# Patient Record
Sex: MALE | Race: WHITE | NOT HISPANIC OR LATINO
[De-identification: names, ages, dates, MRNs, and addresses within clinical notes are randomized per-mention and may not be internally consistent; named-entity substitution may affect disease eponyms.]

---

## 2021-08-16 PROBLEM — Z00.00 ENCOUNTER FOR PREVENTIVE HEALTH EXAMINATION: Status: ACTIVE | Noted: 2021-08-16

## 2022-04-12 ENCOUNTER — APPOINTMENT (OUTPATIENT)
Dept: ORTHOPEDIC SURGERY | Facility: CLINIC | Age: 72
End: 2022-04-12
Payer: MEDICARE

## 2022-04-12 DIAGNOSIS — Z78.9 OTHER SPECIFIED HEALTH STATUS: ICD-10-CM

## 2022-04-12 PROCEDURE — 99024 POSTOP FOLLOW-UP VISIT: CPT

## 2022-04-12 NOTE — REASON FOR VISIT
[FreeTextEntry2] : pt is here for a po visit  pt had surgery on 3/10/22  for his right knee. the pain in the right knee  has been good. pt is doing pt which has been helping a lot.

## 2022-04-12 NOTE — HISTORY OF PRESENT ILLNESS
[3] : 3 [1] : 2 [Rest] : rest [Physical therapy] : physical therapy [] : Post Surgical Visit: yes [de-identified] : rt knee 1 month posto p [place]  [place]  [FreeTextEntry1] : right knee  [de-identified] : motion  [de-identified] : 3/10/22

## 2022-04-12 NOTE — DISCUSSION/SUMMARY
[de-identified] : feeling better will cont therapy and slowly start to resume activities as tolerated discusse dif needed  next visit possible injeciton\par normal course with good progress and no evidence of infection, continue rehab and appropriate nsaids\par \par

## 2022-04-12 NOTE — PHYSICAL EXAM
[Right] : right knee [3___] : quadriceps 3[unfilled]/5 [4___] : hamstring 4[unfilled]/5 [] : full flexion and extension without pain 0-140

## 2022-05-02 ENCOUNTER — APPOINTMENT (OUTPATIENT)
Dept: ORTHOPEDIC SURGERY | Facility: CLINIC | Age: 72
End: 2022-05-02
Payer: MEDICARE

## 2022-05-02 VITALS — BODY MASS INDEX: 25.11 KG/M2 | HEIGHT: 67 IN | WEIGHT: 160 LBS

## 2022-05-02 PROCEDURE — J3490M: CUSTOM

## 2022-05-02 PROCEDURE — 20611 DRAIN/INJ JOINT/BURSA W/US: CPT | Mod: RT,58

## 2022-05-02 PROCEDURE — 99024 POSTOP FOLLOW-UP VISIT: CPT

## 2022-05-02 PROCEDURE — L1833: CPT

## 2022-05-02 NOTE — REASON FOR VISIT
[FreeTextEntry2] : pt is here for a po visit of the right knee.  pt has pain in the right knee , pt was playing tennis for a few minutes  a week ago when  the knee started swelling.  pt is doing pt which has been helping a lot.

## 2022-05-02 NOTE — PROCEDURE
[FreeTextEntry3] : Large joint injection was performed of the right. The indication for this procedure was pain, inflammation and x-ray evidence of Osteoarthritis on this or prior visit. The site was prepped with betadine, ethyl chloride sprayed topically and sterile technique used. An injection of Zilretta cc of 32 unites 5mg was used.\par \par Patient was advised to call if redness, pain or fever occur and apply ice for 15 minutes out of every hour for the next 12-24 hours as tolerated.\par \par Patient has tried OTC's including aspirin, Ibuprofen, Aleve etc, or prescription NSAIDS, and/or exercises at home and/or physical therapy without satisfactory response, patient had decreased mobility in the joint and the risks, benefits and alternatives have been discussed, and verbal consent was obtained. Ultrasound guidance was indicated for this patient due to precise injection in the area of tear. All ultrasound images have been permanently captured and stored accordingly in our picture archiving and communication system. Visualization of the needle and placement of injection was performed without complication.\par \par aspiration 25cc synovial fluid

## 2022-05-02 NOTE — HISTORY OF PRESENT ILLNESS
[3] : 3 [1] : 2 [Dull/Aching] : dull/aching [Rest] : rest [Walking] : walking [] : Post Surgical Visit: yes [FreeTextEntry1] : right knee  [FreeTextEntry9] : advil [de-identified] : pt  [de-identified] : 3/14/22

## 2022-05-02 NOTE — DISCUSSION/SUMMARY
[Medication Risks Reviewed] : Medication risks reviewed [Surgical risks reviewed] : Surgical risks reviewed [de-identified] : swelling and effusion from arthritis , will aspirate and try zilretta\par The risks, benefits and contents of the injection have been discussed.  Risks include but are not limited to allergic reaction, flare reaction, permanent white skin discoloration at the injection site and infection.  The patient understands the risks and agrees to having the injection.  All questions have been answered.\par \par for functionalsupport recommend a hinged knee bvrace

## 2022-06-28 ENCOUNTER — APPOINTMENT (OUTPATIENT)
Dept: ORTHOPEDIC SURGERY | Facility: CLINIC | Age: 72
End: 2022-06-28

## 2022-06-28 VITALS — HEIGHT: 67 IN | WEIGHT: 160 LBS | BODY MASS INDEX: 25.11 KG/M2

## 2022-06-28 PROCEDURE — 99213 OFFICE O/P EST LOW 20 MIN: CPT | Mod: 25

## 2022-06-28 PROCEDURE — 20610 DRAIN/INJ JOINT/BURSA W/O US: CPT

## 2022-06-28 NOTE — PROCEDURE
[Large Joint Injection] : Large joint injection [Right] : of the right [Knee] : knee [Pain] : pain [Inflammation] : inflammation [X-ray evidence of Osteoarthritis on this or prior visit] : x-ray evidence of Osteoarthritis on this or prior visit [Betadine] : betadine [Ethyl Chloride sprayed topically] : ethyl chloride sprayed topically [Sterile technique used] : sterile technique used [Synvisc] : Synvisc [#1] : series #1 [] : Patient tolerated procedure well [Call if redness, pain or fever occur] : call if redness, pain or fever occur [Apply ice for 15min out of every hour for the next 12-24 hours as tolerated] : apply ice for 15 minutes out of every hour for the next 12-24 hours as tolerated [Previous OTC use and PT nontherapeutic] : patient has tried OTC's including aspirin, Ibuprofen, Aleve, etc or prescription NSAIDS, and/or exercises at home and/or physical therapy without satisfactory response [Risks, benefits, alternatives discussed / Verbal consent obtained] : the risks benefits, and alternatives have been discussed, and verbal consent was obtained

## 2022-06-29 NOTE — HISTORY OF PRESENT ILLNESS
[de-identified] : Follow up on the right knee today. S/P Menisectomy 3/10/22, started to have increased discomfort in the knee following walking on Sunday. Patient is having similar pains to pre-op. Seems to become worse throughout the day. \jose had a zilretta in may was fine until this past weekend

## 2022-06-29 NOTE — DISCUSSION/SUMMARY
[Medication Risks Reviewed] : Medication risks reviewed [Surgical risks reviewed] : Surgical risks reviewed [de-identified] : zilretta injection in may helped a little bit the pain has flared up recently. discussed his best options right now are gel injections. \par will put in auth for gel injections. discussed total knee replacement but will hold off since he is not a good candidate. pt is retiring soon and wants to be activr \par evaluated knee and decided to proceed with synvisc injections, discussed future treatment and option, will proceed, discussed possible surgery vs alternatives in future\par The risks, benefits and contents of the injection have been discussed.  Risks include but are not limited to allergic reaction, flare reaction, permanent white skin discoloration at the injection site and infection.  The patient understands the risks and agrees to having the injection.  All questions have been answered.\par synvisc #1 today in the RIGHT KNEE disucssed the timing of the injections and the follow up needed, advised pt to ice area if sore

## 2022-06-29 NOTE — PHYSICAL EXAM
[Right] : right knee [4___] : quadriceps 4[unfilled]/5 [5___] : hamstring 5[unfilled]/5 [NL (0)] : extension 0 degrees [] : no extensor lag [TWNoteComboBox7] : flexion 120 degrees

## 2022-07-11 ENCOUNTER — APPOINTMENT (OUTPATIENT)
Dept: ORTHOPEDIC SURGERY | Facility: CLINIC | Age: 72
End: 2022-07-11

## 2022-07-11 VITALS — WEIGHT: 160 LBS | HEIGHT: 67 IN | BODY MASS INDEX: 25.11 KG/M2

## 2022-07-11 PROCEDURE — 20610 DRAIN/INJ JOINT/BURSA W/O US: CPT

## 2022-07-11 PROCEDURE — 99212 OFFICE O/P EST SF 10 MIN: CPT | Mod: 25

## 2022-07-11 NOTE — PROCEDURE
[Large Joint Injection] : Large joint injection [Right] : of the right [Knee] : knee [Pain] : pain [Inflammation] : inflammation [X-ray evidence of Osteoarthritis on this or prior visit] : x-ray evidence of Osteoarthritis on this or prior visit [Betadine] : betadine [Ethyl Chloride sprayed topically] : ethyl chloride sprayed topically [Sterile technique used] : sterile technique used [Synvisc] : Synvisc [#2] : series #2 [] : Patient tolerated procedure well [Call if redness, pain or fever occur] : call if redness, pain or fever occur [Apply ice for 15min out of every hour for the next 12-24 hours as tolerated] : apply ice for 15 minutes out of every hour for the next 12-24 hours as tolerated [Previous OTC use and PT nontherapeutic] : patient has tried OTC's including aspirin, Ibuprofen, Aleve, etc or prescription NSAIDS, and/or exercises at home and/or physical therapy without satisfactory response [Risks, benefits, alternatives discussed / Verbal consent obtained] : the risks benefits, and alternatives have been discussed, and verbal consent was obtained

## 2022-07-12 NOTE — HISTORY OF PRESENT ILLNESS
[de-identified] : pt is here for a fu visit  of the right knee. pt had his 1st synvisc injection  in the right knee  on his  last visit which  helped. pt would like to continue  synvisc injection  in the right knee today

## 2022-07-12 NOTE — PHYSICAL EXAM
[Right] : right knee [NL (0)] : extension 0 degrees [4___] : quadriceps 4[unfilled]/5 [5___] : hamstring 5[unfilled]/5 [] : no extensor lag [TWNoteComboBox7] : flexion 120 degrees

## 2022-07-12 NOTE — DISCUSSION/SUMMARY
[Medication Risks Reviewed] : Medication risks reviewed [Surgical risks reviewed] : Surgical risks reviewed [de-identified] : evaluated knee and decided to proceed with synvisc injections, discussed future treatment and option, will proceed, discussed possible surgery vs alternatives in future\par discussed risks of surgical treatment and nonsurgical treatment of arthritis, discussed risks of steroid injection plus or minus viscosupplementation, risks of zilretta and benefits, role of surgery and mri, risks and role of nsaids and side effects, benefits of therapy\par \par The risks, benefits and contents of the injection have been discussed.  Risks include but are not limited to allergic reaction, flare reaction, permanent white skin discoloration at the injection site and infection.  The patient understands the risks and agrees to having the injection.  All questions have been answered.\par synvisc #2 today in the RIGHT KNEE discussed the timing of the injections and the follow up needed, advised pt to ice area if sore

## 2022-07-18 ENCOUNTER — APPOINTMENT (OUTPATIENT)
Dept: ORTHOPEDIC SURGERY | Facility: CLINIC | Age: 72
End: 2022-07-18

## 2022-07-18 VITALS — HEIGHT: 67 IN | WEIGHT: 160 LBS | BODY MASS INDEX: 25.11 KG/M2

## 2022-07-18 PROCEDURE — 99213 OFFICE O/P EST LOW 20 MIN: CPT | Mod: 25

## 2022-07-18 PROCEDURE — 20610 DRAIN/INJ JOINT/BURSA W/O US: CPT

## 2022-07-18 NOTE — HISTORY OF PRESENT ILLNESS
[de-identified] : Patient is here for Synvisc #3 in the right knee. Pt notes some improvement in the right knee since last visit. Pt has some discomfort in the right knee when walking.

## 2022-07-18 NOTE — PROCEDURE
[Large Joint Injection] : Large joint injection [Right] : of the right [Knee] : knee [Pain] : pain [Inflammation] : inflammation [X-ray evidence of Osteoarthritis on this or prior visit] : x-ray evidence of Osteoarthritis on this or prior visit [Betadine] : betadine [Ethyl Chloride sprayed topically] : ethyl chloride sprayed topically [Sterile technique used] : sterile technique used [Synvisc] : Synvisc [#3] : series #3 [] : Patient tolerated procedure well [Call if redness, pain or fever occur] : call if redness, pain or fever occur [Apply ice for 15min out of every hour for the next 12-24 hours as tolerated] : apply ice for 15 minutes out of every hour for the next 12-24 hours as tolerated [Previous OTC use and PT nontherapeutic] : patient has tried OTC's including aspirin, Ibuprofen, Aleve, etc or prescription NSAIDS, and/or exercises at home and/or physical therapy without satisfactory response [Risks, benefits, alternatives discussed / Verbal consent obtained] : the risks benefits, and alternatives have been discussed, and verbal consent was obtained

## 2022-07-18 NOTE — DISCUSSION/SUMMARY
[Medication Risks Reviewed] : Medication risks reviewed [Surgical risks reviewed] : Surgical risks reviewed [de-identified] : evaluated knee and decided to proceed with synvisc injections, discussed future treatment and option, will proceed, discussed possible surgery vs alternatives in future\par discussed risks of surgical treatment and nonsurgical treatment of arthritis, discussed risks of steroid injection plus or minus viscosupplementation, risks of zilretta and benefits, role of surgery and mri, risks and role of nsaids and side effects, benefits of therapy\par \par The risks, benefits and contents of the injection have been discussed.  Risks include but are not limited to allergic reaction, flare reaction, permanent white skin discoloration at the injection site and infection.  The patient understands the risks and agrees to having the injection.  All questions have been answered.\par synvisc #3 today in the RIGHT KNEE discussed the timing of the injections and the follow up needed, advised pt to ice area if sore\par discussed future surgical and non surgical options\par \par \par

## 2022-07-24 ENCOUNTER — FORM ENCOUNTER (OUTPATIENT)
Age: 72
End: 2022-07-24

## 2022-07-25 ENCOUNTER — APPOINTMENT (OUTPATIENT)
Dept: MRI IMAGING | Facility: CLINIC | Age: 72
End: 2022-07-25

## 2022-07-25 ENCOUNTER — APPOINTMENT (OUTPATIENT)
Dept: ORTHOPEDIC SURGERY | Facility: CLINIC | Age: 72
End: 2022-07-25

## 2022-07-25 VITALS — HEIGHT: 67 IN | BODY MASS INDEX: 25.11 KG/M2 | WEIGHT: 160 LBS

## 2022-07-25 PROCEDURE — 73564 X-RAY EXAM KNEE 4 OR MORE: CPT | Mod: RT

## 2022-07-25 PROCEDURE — 99215 OFFICE O/P EST HI 40 MIN: CPT

## 2022-07-25 PROCEDURE — 73721 MRI JNT OF LWR EXTRE W/O DYE: CPT | Mod: RT,MH

## 2022-07-26 NOTE — PHYSICAL EXAM
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

## 2022-07-26 NOTE — DISCUSSION/SUMMARY
[Medication Risks Reviewed] : Medication risks reviewed [Surgical risks reviewed] : Surgical risks reviewed [de-identified] : discussed the ocd/avn that is seen in the xrays, recommend getting an mri of RIGHT KNEE to eval the ocd/avn. follow up to review the mri results and discuss further treatment options. \par will order a knee brace for him  medial  \par Discussed risks of potential partial knee replacement  surgery. However, due to the risks of the surgery, we will try NSAIDs. 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

## 2022-07-26 NOTE — HISTORY OF PRESENT ILLNESS
[de-identified] : pt is here  for a fu visit of the right knee.  the pain in the right knee has  been worse.\par the right  knee is swollen. pt has been taking Tylenol to alleviate the pain. pt feels discomfort in the right knee . pt had his 3rd Synvisc injection in the right knee which has not helped.   the pain in the right knee  started bothering again on Friday

## 2022-08-01 ENCOUNTER — APPOINTMENT (OUTPATIENT)
Dept: ORTHOPEDIC SURGERY | Facility: CLINIC | Age: 72
End: 2022-08-01

## 2022-08-01 VITALS — BODY MASS INDEX: 25.11 KG/M2 | WEIGHT: 160 LBS | HEIGHT: 67 IN

## 2022-08-01 PROCEDURE — L1833: CPT

## 2022-08-01 PROCEDURE — 99215 OFFICE O/P EST HI 40 MIN: CPT | Mod: 25

## 2022-08-01 NOTE — DISCUSSION/SUMMARY
[Medication Risks Reviewed] : Medication risks reviewed [Surgical risks reviewed] : Surgical risks reviewed [de-identified] : mri confirms spontaneous osteonecrosis, discussed possbility of needing partial or total knee replacement, risks of such and severe nature of disease, also explained that i have had excellent success using  brace and will fit him for such, needs to limit weightbearing\par \par reviewed the mri results with the patient. advised the patient to wear the brace given today all day to help with weightbearing and when he travels to [place]. advised the patient the risks of traveling especially on a plane his knee may swell up due to the pressure. \par discussed that patient does not need total knee replacement now because he should wait to see if the bone will heal but if it doesn’t then we may need to operate.\par will get a new mri in a month to compare, if it is worse then will proceed with TKA.  \par follow up in 3-4 weeks to eval healing \par \par discussed the risks of the steroid injections and the risks of post meniscotomy \par prescribed nsaids and discussed risks of side effects and timing and management of medication.  side effects can include gi ulcers and irritation as welll as kidney failure and bleeding issues\par We had an extensive discussion regarding total knee replacement surgery intervention including risk, benefits and alternatives.  The risks include, but are not limited to infection, bleeding, injury to small nerves and blood vessels, pain, stiffness, phlebitis, DVT and need for secondary procedures.  Preoperative, intraoperative and postoperative care were discussed and outlined to the patient as well.  I have also talked to the patient about the specific risks of computer assisted surgery and robotic surgery including the reported risk of malalignment, increased injury to neurovascular structures; ligamentous structure with minimally invasive computer assisted surgery as well as iatrogenic injury from the pin sites, risks of fracture around the pins or computer error.  In my opinion the benefits outweigh the risk.  The patient would like to proceed with this.\par Discussed risks of potential surgery. However, due to the risks of the surgery, we will try NSAIDs and therapy. Discussed management of medication.\par

## 2022-08-01 NOTE — HISTORY OF PRESENT ILLNESS
[de-identified] : pt is here for MRI review of the right knee. the pain in the right knee  has been better.\par pt feels discomfort in the right knee.

## 2022-08-01 NOTE — DATA REVIEWED
[MRI] : MRI [Right] : of the right [Knee] : knee [Report was reviewed and noted in the chart] : The report was reviewed and noted in the chart [I independently reviewed and interpreted images and report] : I independently reviewed and interpreted images and report [I reviewed the films/CD and agree] : I reviewed the films/CD and agree [FreeTextEntry1] : osteonecrosis with severe chondral delamination

## 2022-09-07 ENCOUNTER — NON-APPOINTMENT (OUTPATIENT)
Age: 72
End: 2022-09-07

## 2022-09-08 ENCOUNTER — NON-APPOINTMENT (OUTPATIENT)
Age: 72
End: 2022-09-08

## 2022-09-08 ENCOUNTER — APPOINTMENT (OUTPATIENT)
Dept: ORTHOPEDIC SURGERY | Facility: CLINIC | Age: 72
End: 2022-09-08

## 2022-09-08 VITALS — WEIGHT: 160 LBS | BODY MASS INDEX: 25.11 KG/M2 | HEIGHT: 67 IN

## 2022-09-08 DIAGNOSIS — M87.9 OSTEONECROSIS, UNSPECIFIED: ICD-10-CM

## 2022-09-08 PROCEDURE — 99205 OFFICE O/P NEW HI 60 MIN: CPT

## 2022-09-08 NOTE — DISCUSSION/SUMMARY
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  He has failed a course of conservative management and is considering a right medial knee compartmental knee arthroplasty versus total knee arthroplasty.  Risk and benefits of each were discussed with the patient he will decide let me know which how he would like to proceed.  He understands that if there is progression of osteoarthritis in the patellofemoral compartment of the contralateral compartment at this partial knee will fail require conversion later to a total knee arthroplasty.  Several questions sought and answered about this.\par \par The patient is an appropriate candidate for consideration of right total knee replacement versus medial knee compartment to knee arthroplasty. An extensive discussion was conducted of the natural history of the disease and the variety of surgical and non-surgical treatment options available to the patient. A risk/benefit analysis was discussed with the patient reviewing the advantages and disadvantages of surgical intervention at this time. Both the level and length of the patient's pain have made additional conservative treatment measures consisting of NSAIDs, physical therapy, corticosteroids, and/or viscosupplementation contraindicated. A full explanation was given of the nature and the purpose of the procedure and anesthesia, its benefits, possible alternative methods of diagnosis or treatment, the risks involved, the possibility of complications, the foreseeable consequences of the procedure and the possible results of the non-treatment. I reviewed the plan of care as well as used a model of a total knee implant equivalent to the one that will be used for their total knee joint replacement. The ability to secure the implant utilizing cement or cementless (press-fit) was discussed with the patient. The patient agrees with the plan of care, as well as the use of implants for their total knee replacement.   We also discussed that if robotic/computer navigation is utilized, then additional incisions may need to be made to accommodate the computer navigation arrays, which will be placed in the femur and tibia.\par \par No guarantee or assurance was made as to the results that may be obtained. Specifically, the risks were identified to include, but are not limited to the following: Infection, phlebitis, pulmonary embolism, death, paralysis, dislocation, pain, stiffness, instability, limp, weakness, breakage, leg-length inequality, uncontrolled bleeding, nerve injury, blood vessel injury, pressure sores, anesthetic risks, delayed healing of wound and bone, and wear and loosening. Further discussion was undertaken with the patient about the details of surgical preparation, treatment, and postoperative rehabilitation including medical clearance, autotransfusion, the hospital course, and the postoperative rehabilitation involved. All in all, I feel that this patient is a good candidate for surgical reconstruction.\par \par The patient and I discussed the current SARS-CoV-2 (COVID-19) pandemic which has affected our local hospitals. We discussed that our hospitals treat patients with COVID-19. All efforts will be made to avoid cohorting the patient with diagnosed or suspected COVID-19 patient. They also understand that we will screen them 24-48 hours prior to surgery. Despite our best efforts, there is a potential risk for iatrogenic transmission of COVID-19 to the patient during the perioperative period. Lien COVID-19 during the perioperative period may increase the patient´s risks of an adverse outcome including postoperative pneumonia, difficulty breathing, requirement for a breathing tube (general endotracheal intubation), and death. The patient is understanding of this risk, and is willing to proceed with surgery at this time.\par \par The patient and I discussed the option for outpatient joint replacement. They will not stay overnight in the hospital after surgery and will discharge home on the same day of surgery. The risks and benefits of this type of rapid recovery protocol was reviewed with the patient and they are in agreement with proceeding with outpatient joint replacement surgery. They understand that in the event of an emergency, that they will be transferred to the main hospital for inpatient care.

## 2022-09-08 NOTE — HISTORY OF PRESENT ILLNESS
[de-identified] : This is very nice 72-year-old gentleman experiencing right medial knee pain, which is severe in intensity. The pain substantially limits activities of daily living. Walking tolerance is reduced. Medication and activity modification have been minimally effective for a period lasting greater than three months in duration. Assistive devices and external support were not deemed by the patient to be helpful in improving their function. Due to the severity of osteoarthritis and level of pain, physical therapy is contraindicated. Pain and restriction of function are intolerable at this time. The patient denies any radiation of the pain to the feet and it is not associated with numbness, tingling, or weakness.

## 2022-09-08 NOTE — PHYSICAL EXAM
[de-identified] : Patient is well nourished, well-developed, in no acute distress, with appropriate mood and affect. The patient is oriented to time, place, and person. Respirations are even and unlabored.  Gait evaluation does reveal a limp. There is no inguinal adenopathy. Examination of the contralateral knee shows normal range of motion, strength, no tenderness, and intact skin. The affected limb is well-perfused, without skin lesions, shows a grossly normal motor and sensory examination. Knee motion causes significant pain and is tender to palpation in the medial joint line. There is no tenderness to palpation in the lateral side of the knee. The knee moves from 0 to 125 degrees. The knee is stable within that range-of-motion to AP and ML stress. The alignment of the knee is 5 degrees varus. Patella grind is negative. Muscle strength is normal. Pedal pulses are palpable. Hip examination was negative. [de-identified] : Long standing knee, AP knee, lateral knee, and patellar views of the right knee were brought in by the patient which I reviewed and demonstrate no evidence of degenerative joint disease of the knee, fractures but does demonstrate evidence of medial compartment narrowing and medial femoral condyle osteonecrosis with collapse.\par \par This patient brings with him an MRI of the right knee.  Reviewed the MR imaging with the patient was demonstrate medial compartment narrowing and medial femoral condyle osteonecrosis with collapse.

## 2022-09-12 ENCOUNTER — APPOINTMENT (OUTPATIENT)
Dept: ORTHOPEDIC SURGERY | Facility: CLINIC | Age: 72
End: 2022-09-12

## 2022-09-12 VITALS — WEIGHT: 160 LBS | BODY MASS INDEX: 25.11 KG/M2 | HEIGHT: 67 IN

## 2022-09-12 DIAGNOSIS — Z98.890 OTHER SPECIFIED POSTPROCEDURAL STATES: ICD-10-CM

## 2022-09-12 DIAGNOSIS — M87.00 IDIOPATHIC ASEPTIC NECROSIS OF UNSPECIFIED BONE: ICD-10-CM

## 2022-09-12 PROCEDURE — 73564 X-RAY EXAM KNEE 4 OR MORE: CPT | Mod: LT

## 2022-09-12 PROCEDURE — 99215 OFFICE O/P EST HI 40 MIN: CPT

## 2022-09-13 NOTE — DISCUSSION/SUMMARY
[Medication Risks Reviewed] : Medication risks reviewed [Surgical risks reviewed] : Surgical risks reviewed [de-identified] : left knee 8 years s/p arthroscopy looks great, no evidence of progression of arthritis, only minimal pain, discussed injections vs surgery vs nsaids and risks of such but will hold off for now\par right knee with osteonecrosis actually mildly improved, was a lot better after injection in past but now worsening, discussed risks and benefits of knee replacement vs partial-see sanchez note for risks which i re reviewed and agree wt, due to high risks suggested he try another injection and continue brace, recommend repeat xray and mri next month to assess future plans

## 2022-09-13 NOTE — PHYSICAL EXAM
[NL (0)] : extension 0 degrees [4___] : quadriceps 4[unfilled]/5 [5___] : hamstring 5[unfilled]/5 [All Views] : anteroposterior, lateral, skyline, and anteroposterior standing [Right] : right knee [] : non-antalgic [Left] : left knee [Mild tricompartmental OA medial narrowing] : Mild tricompartmental OA medial narrowing [TWNoteComboBox7] : flexion 120 degrees

## 2022-10-14 ENCOUNTER — APPOINTMENT (OUTPATIENT)
Dept: MRI IMAGING | Facility: CLINIC | Age: 72
End: 2022-10-14

## 2022-10-24 ENCOUNTER — APPOINTMENT (OUTPATIENT)
Dept: ORTHOPEDIC SURGERY | Facility: CLINIC | Age: 72
End: 2022-10-24

## 2022-11-09 ENCOUNTER — APPOINTMENT (OUTPATIENT)
Dept: ORTHOPEDIC SURGERY | Facility: HOSPITAL | Age: 72
End: 2022-11-09

## 2023-08-01 ENCOUNTER — APPOINTMENT (OUTPATIENT)
Dept: ORTHOPEDIC SURGERY | Facility: CLINIC | Age: 73
End: 2023-08-01
Payer: MEDICARE

## 2023-08-01 VITALS — BODY MASS INDEX: 25.11 KG/M2 | HEIGHT: 67 IN | WEIGHT: 160 LBS

## 2023-08-01 DIAGNOSIS — M17.11 UNILATERAL PRIMARY OSTEOARTHRITIS, RIGHT KNEE: ICD-10-CM

## 2023-08-01 DIAGNOSIS — M17.12 UNILATERAL PRIMARY OSTEOARTHRITIS, LEFT KNEE: ICD-10-CM

## 2023-08-01 PROCEDURE — J3490M: CUSTOM | Mod: NC

## 2023-08-01 PROCEDURE — 99214 OFFICE O/P EST MOD 30 MIN: CPT | Mod: 25

## 2023-08-01 PROCEDURE — 73030 X-RAY EXAM OF SHOULDER: CPT | Mod: LT

## 2023-08-01 PROCEDURE — 20611 DRAIN/INJ JOINT/BURSA W/US: CPT | Mod: LT

## 2023-08-02 PROBLEM — M17.11 PRIMARY OSTEOARTHRITIS OF RIGHT KNEE: Status: ACTIVE | Noted: 2022-05-02

## 2023-08-02 PROBLEM — M17.12 PRIMARY OSTEOARTHRITIS OF LEFT KNEE: Status: ACTIVE | Noted: 2022-09-13

## 2023-08-02 NOTE — HISTORY OF PRESENT ILLNESS
[de-identified] : Patient is here for a new injury to the left shoulder without specific injury. Patient notes interior pain and has not gotten relief from nsaids. Pain is worse during AM and has increased pain with reaching. Patient notes pain was acute and is now chronic.

## 2023-08-02 NOTE — PROCEDURE
[FreeTextEntry3] : Large joint injection was performed of the left shoulder. An injection of Bupivacaine (Marcaine) cc of 0.5% was used Betamethasone (Celestone) cc of 6mg.  Patient was advised to call if redness, pain or fever occur and apply ice for 15 minutes out of every hour for the next 12-24 hours as tolerated.   Patient has tried OTC's including aspirin, Ibuprofen, Aleve, etc or prescription NSAIDS, and/or exercises at home and/or physical therapy without satisfactory response and the risks benefits, and alternatives have been discussed, and verbal consent was obtained.  Ultrasound guidance was indicated for this patient due to precise injection in area of tear. All ultrasound images have been permanently captured and stored accordingly in our picture archiving and communication system. Visualization of the needle and placement of injection was performed without complication.  The findings showed no evidence of ligament, tendon or muscle tear and no effusion or other fluid collection.

## 2023-08-02 NOTE — PHYSICAL EXAM
[Sitting] : sitting [5 ___] : forward flexion 5[unfilled]/5 [5___] : internal rotation 5[unfilled]/5 [Left] : left shoulder [] : no mass(es) palpated [FreeTextEntry1] : bone spur

## 2023-08-02 NOTE — DISCUSSION/SUMMARY
[Medication Risks Reviewed] : Medication risks reviewed [Surgical risks reviewed] : Surgical risks reviewed [de-identified] : Patient is here for new injury of the left shoulder   Xray of left shoulder revealed a bone spur  Due to worsening pain and instability with mechanical symptoms, recommend the patient obtain MRI  to evaluate for impingment of the rotator cuff. Follow up after MRI to possibly rule out surgical pathology and discuss future treatment options.  Discussed treatment options in the form of injection therapy. Patient elected to receive LEFT KNEE subacromial 9/2 under ultrasound guidance. Advised patient to rest and ice the area The risks, benefits and contents of the injection have been discussed. Risks include but are not limited to allergic reaction, flare reaction, permanent white skin discoloration at the injection site and infection.  The patient understands the risks and agrees to having the injection.  All questions have been answered. Discussed the timing of the injections and the follow up that is needed. Advised the patient to ice the area that was injected and that it may take a few days before experiencing relief.  Prescribed patient Mobic, and discussed risks of side effects and timing and management of medication.  Side effects can include but are not limited to gi ulcers and irritation, as well as kidney failure and bleeding issues. Use as directed and take with food. knees are doing much better minimal pain, nsaids as needed Start PT f/u 2 weeks

## 2023-08-14 ENCOUNTER — APPOINTMENT (OUTPATIENT)
Dept: ORTHOPEDIC SURGERY | Facility: CLINIC | Age: 73
End: 2023-08-14
Payer: MEDICARE

## 2023-08-14 VITALS — BODY MASS INDEX: 25.11 KG/M2 | HEIGHT: 67 IN | WEIGHT: 160 LBS

## 2023-08-14 DIAGNOSIS — S46.012A STRAIN OF MUSCLE(S) AND TENDON(S) OF THE ROTATOR CUFF OF LEFT SHOULDER, INITIAL ENCOUNTER: ICD-10-CM

## 2023-08-14 DIAGNOSIS — M75.52 BURSITIS OF LEFT SHOULDER: ICD-10-CM

## 2023-08-14 PROCEDURE — 99214 OFFICE O/P EST MOD 30 MIN: CPT

## 2023-08-15 ENCOUNTER — APPOINTMENT (OUTPATIENT)
Dept: MRI IMAGING | Facility: CLINIC | Age: 73
End: 2023-08-15
Payer: MEDICARE

## 2023-08-15 PROBLEM — M75.52 BURSITIS OF LEFT SHOULDER: Status: ACTIVE | Noted: 2023-08-02

## 2023-08-15 PROCEDURE — 73221 MRI JOINT UPR EXTREM W/O DYE: CPT | Mod: LT,MH

## 2023-08-15 NOTE — DISCUSSION/SUMMARY
[Medication Risks Reviewed] : Medication risks reviewed [Surgical risks reviewed] : Surgical risks reviewed [de-identified] :  The patient reports 1 week of improvement from previous left shoulder CSI on 08/01 with return of symptoms.   Due to worsening pain and weakness with mechanical symptoms despite injection therapy, recommend the patient obtain MRI left shoulder to rule out rotator cuff tearing. Follow up after MRI to possibly rule out surgical pathology and discuss future treatment options.   The patient has proven refractory to all previous conservative treatment modalities including but not limited to 6 weeks of formal physical therapy, home exercises, activity modifications, oral anti-inflammatories such as Motrin, RICE, etc.   signs and symptoms of rotator cuff tear, discussed risks of potential rotator cuff surgery and risks of operative and non-operative treatment of of tendonitis and impingement, will obtain mri to see if surgery is necessary and guide future treatment, in interim discussed use of nsaids and side effects and recommended rehab and discussed risks and benefits of injection.   Discussed all possible treatment options dependent on MRI results - possible rotator cuff repair with regeneten patch if tearing is partial. Will return to discussion of indefinite treatment after review of MRI.   Prescribed patient Mobic, and discussed risks of side effects and timing and management of medication.  Side effects can include but are not limited to gi ulcers and irritation, as well as kidney failure and bleeding issues. Use as directed and take with food.   Follow up 1 week.

## 2023-08-15 NOTE — HISTORY OF PRESENT ILLNESS
[de-identified] : Patient is here for a follow up on the left shoulder. Patient notes the shoulder is feeling slightly better and patient notes he was able to get into therapy. (nithin) Patient notes the next steps as he is moving soon. Patient notes celestone injection gave him good relief for about a week.

## 2023-08-15 NOTE — PHYSICAL EXAM
[Left] : left shoulder [NL (0-180)] : full active abduction 0-180 degrees [NL (0-70)] : full internal rotation 0-70 degrees [NL (0-90)] : full external rotation 0-90 degrees [Supine] : supine [Sitting] : sitting [5 ___] : forward flexion 5[unfilled]/5 [5___] : internal rotation 5[unfilled]/5 [] : negative posterior shift [de-identified] : +empty can signs

## 2023-08-21 ENCOUNTER — APPOINTMENT (OUTPATIENT)
Dept: ORTHOPEDIC SURGERY | Facility: CLINIC | Age: 73
End: 2023-08-21
Payer: MEDICARE

## 2023-08-21 VITALS — BODY MASS INDEX: 25.11 KG/M2 | HEIGHT: 67 IN | WEIGHT: 160 LBS

## 2023-08-21 DIAGNOSIS — E78.00 PURE HYPERCHOLESTEROLEMIA, UNSPECIFIED: ICD-10-CM

## 2023-08-21 DIAGNOSIS — S46.012A STRAIN OF MUSCLE(S) AND TENDON(S) OF THE ROTATOR CUFF OF LEFT SHOULDER, INITIAL ENCOUNTER: ICD-10-CM

## 2023-08-21 PROCEDURE — 99214 OFFICE O/P EST MOD 30 MIN: CPT

## 2023-08-21 RX ORDER — EZETIMIBE 10 MG/1
10 TABLET ORAL
Refills: 0 | Status: ACTIVE | COMMUNITY

## 2023-08-21 RX ORDER — ROSUVASTATIN CALCIUM 20 MG/1
20 TABLET, FILM COATED ORAL
Refills: 0 | Status: ACTIVE | COMMUNITY

## 2023-08-22 NOTE — DISCUSSION/SUMMARY
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  We reviewed the mri findings. We discussed treatment options, both operative and non operative. I do think he is a candidate for surgery. Pain relief is a goal as well as improving function and motion.  The patient has proven refractory to all previous conservative treatment modalities including but not limited to 6 weeks of formal physical therapy, home exercises, activity modifications, oral anti-inflammatories such as Motrin, RICE, etc.  The risks and benefits of surgery have been discussed. Risks include but are not limited to bleeding, infection, reaction to anesthesia, injury to blood vessels and nerves, malunion, nonunion, DVT, PE, necessity of repeat surgery, chronic pain, loss of limb and death. The patient understands the risks and agrees with the surgical plan. All questions have been answered. We reviewed the mri findings. We discussed treatment options, both operative and non operative. I do think he is a candidate for surgery. Pain relief is a goal as well as improving function and motion.   Interscalene anesthesia, general anesthesia and post operative pain management were discussed. The importance of physical therapy postoperative, the gradual recovery and and the rehabilitation program with initial driving restrictions were noted. The use of a sling for for functional recovery was reviewed. Patient understands there are no garauntees. The benefits of decreased pain, increased function and restoring anatomy were outlined. The risks were reviewed including, but not limited to, infection, failure, bleeding, stiffness, pain, clotting, fracture, retear, hardware failure, deformity, functional limitation, scarring, neurovascular compromise, and narcotic use issues. Under certain circumstances we discussed, further surgery may be indicated.   Patient understands 100% recovery is not expected, and the desired level of function may not be achievable. The complicated nature of the patients condition, including the tear pattern, was noted. We discussed the potential for a prolonged recovery course and the potential for this to affect the patients activites, which could include work regimen. Patients questions were answered. Other options can be pursued, as we discussed.   Patient does wish to proceed with surgery. THis could include shoulder arthoscopy, debridement, synovectomy, decompression, posterior capsuleyosis of adhesions, medium cuff repair, possible biceps tenodesis. We will schedule this at the earliest mutual convenient time. St vs LT issues noted. NSAID uses outlined. Patient will continue HEP, including sleeper stretch.

## 2023-08-22 NOTE — HISTORY OF PRESENT ILLNESS
[de-identified] : Patient is here to follow up on MRI for left shoulder. No improvement. Celestone injection on 8/1/23 - relief for a week.

## 2023-08-22 NOTE — DATA REVIEWED
[MRI] : MRI [Left] : left [Shoulder] : shoulder [FreeTextEntry1] : Full thickness tearing of the supraspinatus, 1cm and 5mm of retraction.  Infraspinatus tendinitis. Biceps tenosynovitis and subluxation. AC joint djd with bone spurs.

## 2023-08-22 NOTE — PHYSICAL EXAM
[Left] : left shoulder [NL (0-180)] : full active abduction 0-180 degrees [NL (0-70)] : full internal rotation 0-70 degrees [NL (0-90)] : full external rotation 0-90 degrees [Supine] : supine [Sitting] : sitting [5 ___] : forward flexion 5[unfilled]/5 [5___] : internal rotation 5[unfilled]/5 [] : negative posterior shift [de-identified] : +empty can signs

## 2023-08-25 ENCOUNTER — APPOINTMENT (OUTPATIENT)
Dept: ORTHOPEDIC SURGERY | Facility: CLINIC | Age: 73
End: 2023-08-25
Payer: MEDICARE

## 2023-08-25 PROCEDURE — L3670: CPT | Mod: LT

## 2023-09-06 RX ORDER — OXYCODONE AND ACETAMINOPHEN 10; 325 MG/1; MG/1
10-325 TABLET ORAL
Qty: 20 | Refills: 0 | Status: ACTIVE | COMMUNITY
Start: 2023-09-06

## 2023-09-07 ENCOUNTER — APPOINTMENT (OUTPATIENT)
Age: 73
End: 2023-09-07
Payer: MEDICARE

## 2023-09-07 PROCEDURE — 29824 SHO ARTHRS SRG DSTL CLAVICLC: CPT | Mod: LT

## 2023-09-07 PROCEDURE — 29828 SHO ARTHRS SRG BICP TENODSIS: CPT | Mod: LT

## 2023-09-07 PROCEDURE — ZZZZZ: CPT

## 2023-09-07 PROCEDURE — 29826 SHO ARTHRS SRG DECOMPRESSION: CPT | Mod: LT

## 2023-09-07 PROCEDURE — 29827 SHO ARTHRS SRG RT8TR CUF RPR: CPT | Mod: LT

## 2023-09-07 PROCEDURE — 29823 SHO ARTHRS SRG XTNSV DBRDMT: CPT | Mod: LT

## 2023-09-07 RX ORDER — ZOLPIDEM TARTRATE 10 MG/1
10 TABLET ORAL
Qty: 30 | Refills: 0 | Status: ACTIVE | COMMUNITY
Start: 2023-09-07 | End: 1900-01-01

## 2023-09-07 RX ORDER — HYDROCODONE BITARTRATE AND ACETAMINOPHEN 10; 325 MG/1; MG/1
10-325 TABLET ORAL
Qty: 40 | Refills: 0 | Status: ACTIVE | COMMUNITY
Start: 2023-09-07 | End: 1900-01-01

## 2023-09-12 ENCOUNTER — APPOINTMENT (OUTPATIENT)
Dept: ORTHOPEDIC SURGERY | Facility: CLINIC | Age: 73
End: 2023-09-12
Payer: MEDICARE

## 2023-09-12 VITALS — WEIGHT: 160 LBS | HEIGHT: 67 IN | BODY MASS INDEX: 25.11 KG/M2

## 2023-09-12 PROCEDURE — 99024 POSTOP FOLLOW-UP VISIT: CPT

## 2023-10-10 ENCOUNTER — APPOINTMENT (OUTPATIENT)
Dept: ORTHOPEDIC SURGERY | Facility: CLINIC | Age: 73
End: 2023-10-10
Payer: MEDICARE

## 2023-10-10 VITALS — BODY MASS INDEX: 25.11 KG/M2 | HEIGHT: 67 IN | WEIGHT: 160 LBS

## 2023-10-10 PROCEDURE — 99024 POSTOP FOLLOW-UP VISIT: CPT

## 2023-11-07 ENCOUNTER — APPOINTMENT (OUTPATIENT)
Dept: ORTHOPEDIC SURGERY | Facility: CLINIC | Age: 73
End: 2023-11-07
Payer: MEDICARE

## 2023-11-07 VITALS — WEIGHT: 160 LBS | BODY MASS INDEX: 25.11 KG/M2 | HEIGHT: 67 IN

## 2023-11-07 DIAGNOSIS — Z98.890 OTHER SPECIFIED POSTPROCEDURAL STATES: ICD-10-CM

## 2023-11-07 PROCEDURE — 99024 POSTOP FOLLOW-UP VISIT: CPT
